# Patient Record
(demographics unavailable — no encounter records)

---

## 2025-07-23 NOTE — HISTORY OF PRESENT ILLNESS
[Spouse] : spouse [Other: _____] : [unfilled] [de-identified] : MARIO NAVARRO is a 87 year M who was last seen here in the Fall of 2023. He resides part-yr in [place]. He has a history of CHF, COPD,CRI, GERD, Neuropathy, and chronic Insomnia who presents today after a recent fall in his backyard-after he tripped over a garden hose. Luckily he sustained no fractures or serious injuries. He had previously fallen 2 weeks ago while in the house too-no injuries Upon ER  discharge he was told to f/u for abnl renal function -as the ER was unaware of pre-existing CRI. His creatinine from 2023 was elevated to 1.58-at that time he was advised to supply me with prior medical records documenting his renal function. He did not do this. His Creatinine was now found to be 1.70 with a GFR of 39 mls/minute. He is currently on Lasix, Entresto, Aldactone and Farxiga. He declined hospital admission- but both his wife and daughter describe his difficulties with ADLs. He needs assistance with bathing, ambulating, going to appts etc. He is really not independent withhis ADLs. They are requesting home care services.  Finally, he has a 'boil' on his chest-he has been putting topical abx ointment and sees some improvement. What to do?  He is requesting medication refills-as many of his doctors are in [place]. I explained that I would not manage his cardiac condition-but could refills some of his other medications. For his chronic insomnia he takes Ambien @ 10 mg QHS- (family confirms dose).He states 5 mg 'doesn't touch him'. He has difficulty securing refills in [place] from the [place] doctor-and asking for refills. I have agreed to refill his PPI  and Neurontin (although not convinced-this med may have contributed to his recent falls).

## 2025-07-23 NOTE — PHYSICAL EXAM
[Normal Sclera/Conjunctiva] : normal sclera/conjunctiva [Normal Outer Ear/Nose] : the outer ears and nose were normal in appearance [No Respiratory Distress] : no respiratory distress  [Normal Rate] : normal rate  [Regular Rhythm] : with a regular rhythm [No Edema] : there was no peripheral edema [No Acute Distress] : no acute distress [No Focal Deficits] : no focal deficits [Speech Grossly Normal] : speech grossly normal [Normal Mood] : the mood was normal [de-identified] : Thin, elderly, fatigued appearing - [de-identified] : few crackles at bases [de-identified] : ambulates with a walker. [de-identified] : indurated nodule on the anterior chest wall.-no drainage noted.  [de-identified] : verbose and circumferential-constantly needs to be re-directed

## 2025-07-23 NOTE — ASSESSMENT
[FreeTextEntry1] : l have explained to the patient and his family that home care is difficult to secure from an ambulatory setting and that most home care is arranged from in-patient admissions. He would have needed to be admitted for 72 hrs to qualify for home care. They can pay privately for a - Referral to S and [facility] Home care services given.

## 2025-07-23 NOTE — PLAN
Progress Note - Hospitalist   Name: Mattie Varela 81 y.o. female I MRN: [id number]  Unit/Bed#: E4 -01 I Date of Admission: 11/17/2024   Date of Service: 12/1/2024 I Hospital Day: 14    Assessment & Plan  Chest pain  Complained of chest pain with inspiration  Obtained EKG- without signs of acute ischemia, Trop was 22  Chest x-ray: without pneumonia, effusion, pneumothorax  Patient with risk factors for PE of immobility, recent fracture -PE ruled out with CTA chest  Noted heavy atherosclerotic coronary artery calcification  Last cath was in 2023 with known disease that was treated with medical management  Discussed with cardiology and ongoing medical management recommended  Patella fracture  [facility]  CT left knee showing what appears to be a fracture of her bipartite patella   Seen by orthopedic team  Knee immobilizer x 2 weeks with WBAT to LLE   Office follow-up in 2 weeks with repeat x-rays   PT OT recommending moderate resource intensity-level 2    Type 2 diabetes mellitus with other skin complications (HCC)  Lab Results   Component Value Date    HGBA1C 7.4 (H) 10/12/2024     Recent Labs     11/30/24  1644 11/30/24  2124 12/01/24  0805 12/01/24  1133   POCGLU 148* 169* 161* 218*   Holding Metformin  Continue insulin sliding scale  Added Premeal insulin at 3 units 3 times daily with meals  Urinary retention  Had overactive bladder in the past and received Botox.   Continues to have urinary retention requiring ahn catheter placement. For outpatient voiding trial.  Void trial should occur with urology in the outpt setting  UA >100,000  cfu GNR. Was recently treated for Klebsiella cystitis  Without symptoms, fever or leukocytosis & chronic ahn, monitor off abx  Chronic pain disorder  History of chronic pain with degenerative spine and history of spinal stimulator in place (although does not work)  On pain medication in the outpatient setting  PDMP queried- no red flags  The patient has not  found oxycodone helpful.  She will be started on oral hydromorphone.  Muscle relaxants and other sedating drugs will be stopped.  Gabapentin will be increased somewhat.  Hypertension  Home regimen metoprolol succinate 50 mg twice daily  Dosage reduced in the setting of lowish blood pressures  Currently on metoprolol succinate 12.5 mg twice daily-for SBP less than 110  Hypothyroidism  Continue levothyroxine  Insomnia  Zolpidem has been stopped since the patient has been started on hydromorphone.  Degenerative lumbar spinal stenosis    SADAF (acute kidney injury) (HCC)  Torsemide restarted-monitor cr  Acute on chronic diastolic congestive heart failure (HCC)  Wt Readings from Last 3 Encounters:   12/01/24 80 kg (176 lb 5.9 oz)   10/18/24 74.3 kg (163 lb 12.8 oz)   10/02/24 85.5 kg (188 lb 9.6 oz)   81 year old female presented with shortness of breath, lower extremity edema, volume overload, and weight gain secondary to acute on chronic diastolic congestive heart failure.  Echo was updated-EF 51%, systolic function low normal, septic akinetic to paradoxical, endocardial detection was significantly limited.  Unable to assess diastolic function  On metoprolol 50 mg twice daily  Down about 6.3 L, admission weight 194 pounds, current weight stable at 176 pounds  The cost of Jardiance is prohibitive.  Continue torsemide 30mg bid    VTE Pharmacologic Prophylaxis: VTE Score: 11 High Risk (Score >/= 5) - Pharmacological DVT Prophylaxis Ordered: heparin. Sequential Compression Devices Ordered.    Mobility:   Basic Mobility Inpatient Raw Score: 9  JH-HLM Goal: 3: Sit at edge of bed  JH-HLM Achieved: 3: Sit at edge of bed  JH-HLM Goal achieved. Continue to encourage appropriate mobility.    Patient Centered Rounds: I performed bedside rounds with nursing staff today.   Discussions with Specialists or Other Care Team Provider:     Education and Discussions with Family / Patient: Updated  (daughter Dr. Baires from  [facility]) via phone.    Current Length of Stay: 14 day(s)  Current Patient Status: Inpatient   Certification Statement: The patient will continue to require additional inpatient hospital stay due to placement to rehab  Discharge Plan: Anticipate discharge in 24-48 hrs to rehab facility.    Code Status: Level 1 - Full Code    Subjective   Patient denies any acute complaints on her chronic back pain    Objective :  Temp:  [97.5 °F (36.4 °C)-98.8 °F (37.1 °C)] 97.7 °F (36.5 °C)  HR:  [87-93] 87  BP: (105-114)/(53-63) 110/60  Resp:  [18] 18  SpO2:  [93 %-95 %] 95 %  O2 Device: None (Room air)    Body mass index is 34.44 kg/m².     Input and Output Summary (last 24 hours):     Intake/Output Summary (Last 24 hours) at 12/1/2024 1524  Last data filed at 12/1/2024 0000  Gross per 24 hour   Intake --   Output 600 ml   Net -600 ml       Physical Exam  Vitals and nursing note reviewed.   Constitutional:       General: She is not in acute distress.     Appearance: She is well-developed. She is not toxic-appearing or diaphoretic.   HENT:      Head: Normocephalic and atraumatic.   Eyes:      General: No scleral icterus.     Conjunctiva/sclera: Conjunctivae normal.   Cardiovascular:      Rate and Rhythm: Normal rate and regular rhythm.      Heart sounds: No murmur heard.     No friction rub. No gallop.   Pulmonary:      Effort: Pulmonary effort is normal. No respiratory distress.      Breath sounds: Normal breath sounds. No stridor. No wheezing, rhonchi or rales.   Chest:      Chest wall: No tenderness.   Abdominal:      General: There is no distension.      Palpations: Abdomen is soft. There is no mass.      Tenderness: There is no abdominal tenderness. There is no guarding or rebound.      Hernia: No hernia is present.   Musculoskeletal:         General: No swelling or tenderness.      Cervical back: Neck supple.   Skin:     General: Skin is warm and dry.      Capillary Refill: Capillary refill takes less than 2  seconds.   Neurological:      Mental Status: She is alert.   Psychiatric:         Mood and Affect: Mood normal.           Lines/Drains:  Lines/Drains/Airways       Active Status       Name Placement date Placement time Site Days    Urethral Catheter 16 Fr. 10/26/24  1311  --  36                  Urinary Catheter:  Goal for removal: N/A- Discharging with [facility]                 Lab Results: I have reviewed the following results:   Results from last 7 days   Lab Units 11/30/24  0433   WBC Thousand/uL 6.85   HEMOGLOBIN g/dL 10.3*   HEMATOCRIT % 32.9*   PLATELETS Thousands/uL 354   SEGS PCT % 42*   LYMPHO PCT % 36   MONO PCT % 11   EOS PCT % 9*     Results from last 7 days   Lab Units 11/30/24  0433   SODIUM mmol/L 139   POTASSIUM mmol/L 4.0   CHLORIDE mmol/L 99   CO2 mmol/L 33*   BUN mg/dL 28*   CREATININE mg/dL 1.56*   ANION GAP mmol/L 7   CALCIUM mg/dL 9.3   GLUCOSE RANDOM mg/dL 152*         Results from last 7 days   Lab Units 12/01/24  1133 12/01/24  0805 11/30/24  2124 11/30/24  1644 11/30/24  1128 11/30/24  0834 11/29/24  2033 11/29/24  1622 11/29/24  1111 11/29/24  0713 11/28/24  2103 11/28/24  1541   POC GLUCOSE mg/dl 218* 161* 169* 148* 152* 136 164* 162* 206* 150* 200* 223*               Recent Cultures (last 7 days):         Imaging Results Review: No pertinent imaging studies reviewed.  Other Study Results Review: No additional pertinent studies reviewed.    Last 24 Hours Medication List:     Current Facility-Administered Medications:     acetaminophen (TYLENOL) tablet 975 mg, Q8H ARY    albuterol (PROVENTIL HFA,VENTOLIN HFA) inhaler 2 puff, Q6H PRN    aspirin chewable tablet 81 mg, Daily    atorvastatin (LIPITOR) tablet 40 mg, Daily With Dinner    gabapentin (NEURONTIN) capsule 300 mg, HS    guaiFENesin (MUCINEX) 12 hr tablet 600 mg, Q12H ARY    heparin (porcine) subcutaneous injection 5,000 Units, Q8H ARY    HYDROmorphone (DILAUDID) tablet 2 mg, 4x Daily    HYDROmorphone HCl (DILAUDID) injection 0.2 mg, Q4H  PRN    insulin lispro (HumALOG/ADMELOG) 100 units/mL subcutaneous injection 1-6 Units, 4x Daily (AC & HS) **AND** Fingerstick Glucose (POCT), 4x Daily AC and at bedtime    latanoprost (XALATAN) 0.005 % ophthalmic solution 1 drop, HS    levalbuterol (XOPENEX) inhalation solution 1.25 mg, Q6H PRN    levothyroxine tablet 37.5 mcg, Early Morning    lidocaine (LIDODERM) 5 % patch 1 patch, Daily    melatonin tablet 3 mg, HS    methocarbamol (ROBAXIN) tablet 250 mg, Q8H PRN    metoprolol succinate (TOPROL-XL) 24 hr tablet 12.5 mg, Daily    pantoprazole (PROTONIX) EC tablet 40 mg, Early Morning    polyethylene glycol (MIRALAX) packet 17 g, Daily    senna-docusate sodium (SENOKOT S) 8.6-50 mg per tablet 1 tablet, BID    sucralfate (CARAFATE) tablet 1 g, BID AC    torsemide (DEMADEX) tablet 30 mg, BID    white petrolatum-mineral oil (EUCERIN,HYDROCERIN) cream, TID PRN    Administrative Statements   Today, Patient Was Seen By: Manuelito Mccray DO      **Please Note: This note may have been constructed using a voice recognition system.**   [FreeTextEntry1] :  THIS VISIT WAS PART OF AN ONGOING LONGITUDAL RELATIONSHIP DESIGNED TO ADDRESS THE MAJORITY OF THE PATIENT'S HEALTH CARE NEEDS WITH CONSISTENCY OVER A LONG PERIOD OF TIME.

## 2025-07-23 NOTE — ASSESSMENT
[FreeTextEntry1] : l have explained to the patient and his family that home care is difficult to secure from an ambulatory setting and that most home care is arranged from in-patient admissions. He would have needed to be admitted for 72 hrs to qualify for home care. They can pay privately for a - Referral to S and [facility] Home care services given.

## 2025-07-23 NOTE — HISTORY OF PRESENT ILLNESS
[Spouse] : spouse [Other: _____] : [unfilled] [de-identified] : MARIO NAVARRO is a 87 year M who was last seen here in the Fall of 2023. He resides part-yr in [place]. He has a history of CHF, COPD,CRI, GERD, Neuropathy, and chronic Insomnia who presents today after a recent fall in his backyard-after he tripped over a garden hose. Luckily he sustained no fractures or serious injuries. He had previously fallen 2 weeks ago while in the house too-no injuries Upon ER  discharge he was told to f/u for abnl renal function -as the ER was unaware of pre-existing CRI. His creatinine from 2023 was elevated to 1.58-at that time he was advised to supply me with prior medical records documenting his renal function. He did not do this. His Creatinine was now found to be 1.70 with a GFR of 39 mls/minute. He is currently on Lasix, Entresto, Aldactone and Farxiga. He declined hospital admission- but both his wife and daughter describe his difficulties with ADLs. He needs assistance with bathing, ambulating, going to appts etc. He is really not independent withhis ADLs. They are requesting home care services.  Finally, he has a 'boil' on his chest-he has been putting topical abx ointment and sees some improvement. What to do?  He is requesting medication refills-as many of his doctors are in [place]. I explained that I would not manage his cardiac condition-but could refills some of his other medications. For his chronic insomnia he takes Ambien @ 10 mg QHS- (family confirms dose).He states 5 mg 'doesn't touch him'. He has difficulty securing refills in [place] from the [place] doctor-and asking for refills. I have agreed to refill his PPI  and Neurontin (although not convinced-this med may have contributed to his recent falls).

## 2025-07-23 NOTE — PHYSICAL EXAM
[Normal Sclera/Conjunctiva] : normal sclera/conjunctiva [Normal Outer Ear/Nose] : the outer ears and nose were normal in appearance [No Respiratory Distress] : no respiratory distress  [Normal Rate] : normal rate  [Regular Rhythm] : with a regular rhythm [No Edema] : there was no peripheral edema [No Acute Distress] : no acute distress [No Focal Deficits] : no focal deficits [Speech Grossly Normal] : speech grossly normal [Normal Mood] : the mood was normal [de-identified] : Thin, elderly, fatigued appearing - [de-identified] : few crackles at bases [de-identified] : ambulates with a walker. [de-identified] : indurated nodule on the anterior chest wall.-no drainage noted.  [de-identified] : verbose and circumferential-constantly needs to be re-directed